# Patient Record
Sex: FEMALE | Race: BLACK OR AFRICAN AMERICAN | NOT HISPANIC OR LATINO | Employment: UNEMPLOYED | URBAN - METROPOLITAN AREA
[De-identification: names, ages, dates, MRNs, and addresses within clinical notes are randomized per-mention and may not be internally consistent; named-entity substitution may affect disease eponyms.]

---

## 2019-02-10 ENCOUNTER — HOSPITAL ENCOUNTER (EMERGENCY)
Facility: HOSPITAL | Age: 29
Discharge: HOME | End: 2019-02-10
Attending: EMERGENCY MEDICINE | Admitting: EMERGENCY MEDICINE
Payer: COMMERCIAL

## 2019-02-10 ENCOUNTER — APPOINTMENT (EMERGENCY)
Dept: RADIOLOGY | Facility: HOSPITAL | Age: 29
End: 2019-02-10
Payer: COMMERCIAL

## 2019-02-10 VITALS
BODY MASS INDEX: 41.95 KG/M2 | HEART RATE: 101 BPM | DIASTOLIC BLOOD PRESSURE: 78 MMHG | RESPIRATION RATE: 18 BRPM | TEMPERATURE: 98.1 F | OXYGEN SATURATION: 98 % | WEIGHT: 293 LBS | SYSTOLIC BLOOD PRESSURE: 152 MMHG | HEIGHT: 70 IN

## 2019-02-10 DIAGNOSIS — S93.602A SPRAIN OF LEFT FOOT, INITIAL ENCOUNTER: Primary | ICD-10-CM

## 2019-02-10 PROCEDURE — 73610 X-RAY EXAM OF ANKLE: CPT

## 2019-02-10 PROCEDURE — 73630 X-RAY EXAM OF FOOT: CPT

## 2019-02-10 PROCEDURE — 99284 EMERGENCY DEPT VISIT MOD MDM: CPT

## 2019-02-10 RX ORDER — HYDROCODONE BITARTRATE AND ACETAMINOPHEN 5; 325 MG/1; MG/1
1 TABLET ORAL EVERY 6 HOURS PRN
Qty: 12 TABLET | Refills: 0 | Status: SHIPPED | OUTPATIENT
Start: 2019-02-10 | End: 2019-02-13

## 2019-02-10 RX ORDER — AMLODIPINE BESYLATE AND BENAZEPRIL HYDROCHLORIDE 5; 20 MG/1; MG/1
20 CAPSULE ORAL DAILY
COMMUNITY

## 2019-02-10 RX ORDER — ALBUTEROL SULFATE 1.25 MG/3ML
1 SOLUTION RESPIRATORY (INHALATION) EVERY 6 HOURS PRN
COMMUNITY

## 2019-02-10 RX ORDER — HYDROCODONE BITARTRATE AND ACETAMINOPHEN 5; 325 MG/1; MG/1
1 TABLET ORAL ONCE
Status: COMPLETED | OUTPATIENT
Start: 2019-02-10 | End: 2019-02-10

## 2019-02-10 RX ADMIN — HYDROCODONE BITARTRATE AND ACETAMINOPHEN 1 TABLET: 5; 325 TABLET ORAL at 12:46

## 2019-02-10 NOTE — ED PROVIDER NOTES
History  Chief Complaint   Patient presents with    Ankle Injury     Patient c/o left foot and ankle injurythat occurred last night at [facility] [place] when she [place]      33 y o  female with past medical history significant for Hypertension, Hyperlipidemia and Diabetes presents to ED via EMS with chief complaint of left foot and ankle injury  Onset reported as: just prior to arrival   Location reported as: left foot  Quality reported as: sharp pain and swelling  Severity reported as:  Severe, listed as 10/10 on the pain scale  Associated symptoms:  Denies paralysis, parasthesias or weakness to affected lower extremity,  Denies hip pain, Positive for foot pain  Denies knee pain  Modifyers: patient reports walking and weight bearing exacerbate pain  Context:  Patient [place] by EMS, [place] suffering a hyper extension injury to her left foot she reports severe pain and swelling in the foot since the injury  She denies other injuries  She is currently vacationing here from [place], [place]  reviewed past visits via [facility], no prior visits to this ed  History provided by:  Patient and EMS personnel   used: No        Prior to Admission Medications   Prescriptions Last Dose Informant Patient Reported? Taking? albuterol (ACCUNEB) 1 25 MG/3ML nebulizer solution   Yes Yes   Sig: Take 1 ampule by nebulization every 6 (six) hours as needed for wheezing   amLODIPine-benazepril (LOTREL 5-20) 5-20 MG per capsule   Yes Yes   Sig: Take 20 capsules by mouth daily   insulin glargine (BASAGLAR KWIKPEN) 100 units/mL injection pen   Yes Yes   Sig: Inject 20 Units under the skin 3 (three) times a day      Facility-Administered Medications: None       Past Medical History:   Diagnosis Date    Diabetes mellitus ([facility] [address] )     Hypertension        History reviewed  No pertinent surgical history  History reviewed  No pertinent family history    I have reviewed and agree with the history as documented  Social History     Tobacco Use    Smoking status: Never Smoker    Smokeless tobacco: Never Used   Substance Use Topics    Alcohol use: Never     Frequency: Never    Drug use: Never        Review of Systems   Constitutional: Negative for activity change, appetite change, chills, diaphoresis, fatigue and fever  HENT: Negative for congestion, dental problem, drooling, ear discharge, ear pain, facial swelling, hearing loss, mouth sores, nosebleeds, postnasal drip, rhinorrhea, sinus pressure, sinus pain, sneezing, sore throat, tinnitus, trouble swallowing and voice change  Eyes: Negative for photophobia, pain, discharge, redness and itching  Respiratory: Negative for apnea, cough, choking, chest tightness, shortness of breath, wheezing and stridor  Cardiovascular: Negative for chest pain, palpitations and leg swelling  Gastrointestinal: Negative for abdominal distention, abdominal pain, anal bleeding, blood in stool, constipation, diarrhea, nausea, rectal pain and vomiting  Endocrine: Negative for cold intolerance, heat intolerance, polydipsia, polyphagia and polyuria  Genitourinary: Negative for decreased urine volume, difficulty urinating, dysuria, flank pain, frequency, hematuria and urgency  Musculoskeletal: Positive for arthralgias and joint swelling  Negative for back pain, myalgias, neck pain and neck stiffness  Skin: Negative for color change, pallor, rash and wound  Allergic/Immunologic: Negative for environmental allergies, food allergies and immunocompromised state  Neurological: Negative for dizziness, tremors, seizures, syncope, facial asymmetry, speech difficulty, weakness, light-headedness, numbness and headaches  Hematological: Negative for adenopathy  Does not bruise/bleed easily  Psychiatric/Behavioral: Negative for agitation, confusion, decreased concentration and hallucinations   The patient is not nervous/anxious  All other systems reviewed and are negative  Physical Exam  Physical Exam   Constitutional: She is oriented to person, place, and time  She appears well-developed and well-nourished  No distress  /78 (BP Location: Right arm)   Pulse 101   Temp 98 1 °F (36 7 °C) (Oral)   Resp 18   Ht 5' 10" (1 778 m)   Wt (!) 137 kg (302 lb 7 5 oz)   LMP 01/20/2019   SpO2 98%   BMI 43 40 kg/m²    HENT:   Head: Normocephalic and atraumatic  Right Ear: External ear normal    Left Ear: External ear normal    Nose: Nose normal    Mouth/Throat: Oropharynx is clear and moist  No oropharyngeal exudate  Eyes: Pupils are equal, round, and reactive to light  Conjunctivae and EOM are normal  Right eye exhibits no discharge  Left eye exhibits no discharge  No scleral icterus  Neck: Normal range of motion  Neck supple  No JVD present  No tracheal deviation present  Cardiovascular: Normal rate, regular rhythm and intact distal pulses  Pulmonary/Chest: Effort normal and breath sounds normal  No stridor  No respiratory distress  She has no wheezes  She has no rales  She exhibits no tenderness  Abdominal: Soft  Bowel sounds are normal  She exhibits no distension and no mass  There is no tenderness  There is no rebound and no guarding  No hernia  Musculoskeletal: She exhibits edema and tenderness  She exhibits no deformity  Left Foot/Ankle exam: abnormal inspection: soft tissue swelling to dorsum of foot, no gross deformity, SILT to all surfaces, NVI, cap refill less than 3 seconds  Posterior tibial pulse 2/4 normal   Distal foot is normal to inspection, nontender to palpation with FROM  Midfoot is tender to palpation  Proximal knee is normal to inspection, nontender to palpation with FROM  Proximal fibula is nontender to palpation  Ankle is non tender to palpation to all surfaces  ROM of ankle is intact to flexion, extension  No calcaneus or 5th metatarsal tenderness to palpation  Gandaras Test: squeeze of posterior calf produces plantar flexion of foot  No Achilles tendon tenderness to palpation  Lymphadenopathy:     She has no cervical adenopathy  Neurological: She is alert and oriented to person, place, and time  She displays normal reflexes  No cranial nerve deficit or sensory deficit  She exhibits normal muscle tone  Coordination normal    Skin: Skin is warm and dry  Capillary refill takes less than 2 seconds  No rash noted  She is not diaphoretic  No erythema  No pallor  Psychiatric: She has a normal mood and affect  Her behavior is normal  Judgment and thought content normal    Nursing note and vitals reviewed  Vital Signs  ED Triage Vitals   Temperature Pulse Respirations Blood Pressure SpO2   02/10/19 1231 02/10/19 1231 02/10/19 1231 02/10/19 1231 02/10/19 1231   98 1 °F (36 7 °C) 101 18 152/78 98 %      Temp Source Heart Rate Source Patient Position - Orthostatic VS BP Location FiO2 (%)   02/10/19 1231 -- 02/10/19 1231 02/10/19 1231 --   Oral  Lying Right arm       Pain Score       02/10/19 1246       Worst Possible Pain           Vitals:    02/10/19 1231   BP: 152/78   Pulse: 101   Patient Position - Orthostatic VS: Lying       Visual Acuity      ED Medications  Medications   HYDROcodone-acetaminophen (NORCO) 5-325 mg per tablet 1 tablet (1 tablet Oral Given 2/10/19 1246)       Diagnostic Studies  Results Reviewed     None                 XR foot 3+ views LEFT   Final Result by Sarath Smith MD (02/10 1322)      Normal examination  Workstation performed: [id number]         XR ankle 3+ views LEFT   Final Result by Sarath Smith MD (02/10 1321)      Normal left ankle              Workstation performed: [id number]                    Procedures  Procedures       Phone Contacts  ED Phone Contact    ED Course                               MDM  Number of Diagnoses or Management Options  Sprain of left foot, initial encounter: new and requires workup  Diagnosis management comments: ddx includes but is not limited to fracture, contusion, sprain, strain, nerve injury, tendon injury, vascular injury, tendinitis, bursitis, dislocation, plan xray to rule out fracture or dislocation  X-ray images of the left foot and left ankle independently visualized and interpreted by me  Radiology report was reviewed demonstrating no acute fracture or dislocation  X-ray results were discussed with the patient at bedside  Discussed with patient no acute fracture  Discussed with patient symptoms most consistent with foot sprain  Discussed treatment plan including use of splint, use of rest, ice, elevation, crutches, compression, follow up with primary care physician in [place] and Orthopedic surgery in 3-5 days if symptoms not improved  Due to patient's history of high blood pressure will avoid NSAIDs  Add analgesic pain medication  Standard narcotic precautions were given  Reviewed reasons to return to ed  Patient verbalized understanding of diagnosis and agreement with discharge plan of care as well as understanding of reasons to return to ed  Splint check: location left foot,   Type static short leg splint, SILT, NVI, cap refill less than 3 seconds  Skin intact without redness or breakdown  Splint applied by ed tech  Splint checked by me                Amount and/or Complexity of Data Reviewed  Tests in the radiology section of CPT®: ordered and reviewed  Discussion of test results with the performing providers: yes  Obtain history from someone other than the patient: yes (EMS)  Review and summarize past medical records: yes  Independent visualization of images, tracings, or specimens: yes    Patient Progress  Patient progress: stable      Disposition  Final diagnoses:   Sprain of left foot, initial encounter     Time reflects when diagnosis was documented in both MDM as applicable and the Disposition within this note     Time User Action Codes Description Comment    2/10/2019  1:46 PM Madeline Sacks Add [G31 [id number]] Sprain of left foot, initial encounter       ED Disposition     ED Disposition Condition Date/Time Comment    Discharge Stable Sun Feb 10, 2019  1:46 PM Natalie Bonilla discharge to home/self care  Follow-up Information     Follow up With Specialties Details Why Contact Info Additional Information    Mary Lord MD Internal Medicine Call in 1 day for further evaluation of symptoms [address] [facility] [id number] [address] Emergency Department Emergency Medicine Go to  If symptoms worsen [address] [place] [facility] [address] [phone] [place] ED, [address], [place], [facility]          Patient's Medications   Discharge Prescriptions    HYDROCODONE-ACETAMINOPHEN (NORCO) 5-325 MG PER TABLET    Take 1 tablet by mouth every 6 (six) hours as needed for pain (foot pain/initial rx ) for up to 3 daysMax Daily Amount: 4 tablets       Start Date: 2/10/2019 End Date: 2/13/2019       Order Dose: 1 tablet       Quantity: 12 tablet    Refills: 0     No discharge procedures on file      ED Provider  Electronically Signed by           Indiana Rae PA-C  02/10/19 9275

## 2020-02-04 PROBLEM — Z00.00 ENCOUNTER FOR PREVENTIVE HEALTH EXAMINATION: Status: ACTIVE | Noted: 2020-02-04

## 2020-02-13 ENCOUNTER — APPOINTMENT (OUTPATIENT)
Dept: BARIATRICS | Facility: CLINIC | Age: 30
End: 2020-02-13

## 2020-02-27 ENCOUNTER — APPOINTMENT (OUTPATIENT)
Dept: BARIATRICS | Facility: CLINIC | Age: 30
End: 2020-02-27

## 2020-04-22 ENCOUNTER — APPOINTMENT (OUTPATIENT)
Dept: BARIATRICS | Facility: CLINIC | Age: 30
End: 2020-04-22
Payer: MEDICAID

## 2020-04-22 VITALS — BODY MASS INDEX: 40.94 KG/M2 | WEIGHT: 286 LBS | HEIGHT: 70 IN

## 2020-04-22 DIAGNOSIS — Z79.4 TYPE 2 DIABETES MELLITUS W/OUT COMPLICATIONS: ICD-10-CM

## 2020-04-22 DIAGNOSIS — E11.9 TYPE 2 DIABETES MELLITUS W/OUT COMPLICATIONS: ICD-10-CM

## 2020-04-22 DIAGNOSIS — E66.01 MORBID (SEVERE) OBESITY DUE TO EXCESS CALORIES: ICD-10-CM

## 2020-04-22 DIAGNOSIS — J45.20 MILD INTERMITTENT ASTHMA, UNCOMPLICATED: ICD-10-CM

## 2020-04-22 DIAGNOSIS — I10 ESSENTIAL (PRIMARY) HYPERTENSION: ICD-10-CM

## 2020-04-22 PROCEDURE — 99202 OFFICE O/P NEW SF 15 MIN: CPT | Mod: 95

## 2020-04-22 NOTE — ASSESSMENT
[FreeTextEntry1] : e meets the NIH criteria for bariatric surgery and would like to have a laparoscopic sleeve gastrectomy. I have reviewed the risks and benefits of the procedure with the patient, and she understands this information fully.

## 2020-04-22 NOTE — HISTORY OF PRESENT ILLNESS
[Home] : at home, [unfilled] , at the time of the visit. [Other Location: e.g. Home (Enter Location, City,State)___] : at [unfilled] [Patient] : the patient [de-identified] : Patient is a 29 year old female with a long history of morbid obesity not responsive to multiple dietary regimens. She has a BMI of 41.6 and weight-related cmomrbidities including hypertension, type 2 diabetes mellitus requiring insulin and asthma.

## 2021-10-06 PROBLEM — I10 ESSENTIAL HYPERTENSION: Status: ACTIVE | Noted: 2020-04-22
